# Patient Record
Sex: MALE | URBAN - METROPOLITAN AREA
[De-identification: names, ages, dates, MRNs, and addresses within clinical notes are randomized per-mention and may not be internally consistent; named-entity substitution may affect disease eponyms.]

---

## 2018-09-17 ENCOUNTER — APPOINTMENT (RX ONLY)
Age: 48
Setting detail: DERMATOLOGY
End: 2018-09-17

## 2018-09-17 DIAGNOSIS — L57.8 OTHER SKIN CHANGES DUE TO CHRONIC EXPOSURE TO NONIONIZING RADIATION: ICD-10-CM

## 2018-09-17 DIAGNOSIS — L20.89 OTHER ATOPIC DERMATITIS: ICD-10-CM

## 2018-09-17 DIAGNOSIS — L85.3 XEROSIS CUTIS: ICD-10-CM

## 2018-09-17 PROBLEM — L70.0 ACNE VULGARIS: Status: ACTIVE | Noted: 2018-09-17

## 2018-09-17 PROBLEM — I10 ESSENTIAL (PRIMARY) HYPERTENSION: Status: ACTIVE | Noted: 2018-09-17

## 2018-09-17 PROCEDURE — 99203 OFFICE O/P NEW LOW 30 MIN: CPT

## 2018-09-17 PROCEDURE — ? PRESCRIPTION

## 2018-09-17 PROCEDURE — ? DIAGNOSIS COMMENT

## 2018-09-17 PROCEDURE — ? COUNSELING

## 2018-09-17 RX ORDER — CLOBETASOL PROPIONATE 0.5 MG/G
CREAM TOPICAL
Qty: 1 | Refills: 2 | Status: ERX | COMMUNITY
Start: 2018-09-17

## 2018-09-17 RX ADMIN — CLOBETASOL PROPIONATE: 0.5 CREAM TOPICAL at 00:00

## 2018-09-17 ASSESSMENT — LOCATION ZONE DERM
LOCATION ZONE: FACE
LOCATION ZONE: LEG

## 2018-09-17 ASSESSMENT — LOCATION SIMPLE DESCRIPTION DERM
LOCATION SIMPLE: RIGHT PRETIBIAL REGION
LOCATION SIMPLE: SUPERIOR FOREHEAD
LOCATION SIMPLE: LEFT PRETIBIAL REGION

## 2018-09-17 ASSESSMENT — LOCATION DETAILED DESCRIPTION DERM
LOCATION DETAILED: RIGHT PROXIMAL PRETIBIAL REGION
LOCATION DETAILED: LEFT PROXIMAL PRETIBIAL REGION
LOCATION DETAILED: SUPERIOR MID FOREHEAD

## 2018-09-17 NOTE — HPI: RASH
How Severe Is Your Rash?: moderate
Is This A New Presentation, Or A Follow-Up?: Rash
Additional History: He also has itchy areas on both arms.  He thinks he may have an allergy to dyes or detergents that is causing the itching.  He has switched to sensitive skin detergents and has eliminated fabric softeners.  He has not had patch testing done.  He is also interested in a full skin exam.  He does not have a personal or family history of skin cancer.  He moved here last year from [place].

## 2018-09-17 NOTE — PROCEDURE: DIAGNOSIS COMMENT
Detail Level: Simple
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 This is most consistent with an atopic dermatitis picture, but he would like further work-up to see if there is anything he is allergic to (anything he can eliminate or avoid to improve this.) He would like to be scheduled for patch testing if possible.\\n--I discussed using unscented soap (Dove unscented recommended.)\\n--try to limit multiple showers per day. He should use a moisturizer after shower (see under Xerosis.)\\n--agree with avoiding fabric softners and scented detergents\\n